# Patient Record
Sex: FEMALE | URBAN - METROPOLITAN AREA
[De-identification: names, ages, dates, MRNs, and addresses within clinical notes are randomized per-mention and may not be internally consistent; named-entity substitution may affect disease eponyms.]

---

## 2019-02-04 ENCOUNTER — APPOINTMENT (RX ONLY)
Dept: URBAN - METROPOLITAN AREA CLINIC 8 | Facility: CLINIC | Age: 54
Setting detail: DERMATOLOGY
End: 2019-02-04

## 2019-02-04 DIAGNOSIS — L81.1 CHLOASMA: ICD-10-CM

## 2019-02-04 DIAGNOSIS — L65.9 NONSCARRING HAIR LOSS, UNSPECIFIED: ICD-10-CM

## 2019-02-04 DIAGNOSIS — L20.89 OTHER ATOPIC DERMATITIS: ICD-10-CM

## 2019-02-04 PROBLEM — L20.84 INTRINSIC (ALLERGIC) ECZEMA: Status: ACTIVE | Noted: 2019-02-04

## 2019-02-04 PROCEDURE — 11104 PUNCH BX SKIN SINGLE LESION: CPT

## 2019-02-04 PROCEDURE — ? PRESCRIPTION

## 2019-02-04 PROCEDURE — ? COUNSELING

## 2019-02-04 PROCEDURE — 99203 OFFICE O/P NEW LOW 30 MIN: CPT | Mod: 25

## 2019-02-04 PROCEDURE — ? BIOPSY BY PUNCH METHOD

## 2019-02-04 PROCEDURE — ? ADDITIONAL NOTES

## 2019-02-04 RX ORDER — TRIAMCINOLONE ACETONIDE 1 MG/G
CREAM TOPICAL BID
Qty: 1 | Refills: 1 | Status: ERX | COMMUNITY
Start: 2019-02-04

## 2019-02-04 RX ORDER — PHARMACY COMPOUNDING ACCESSORY
1G EACH MISCELLANEOUS QHS
Qty: 20 | Refills: 1 | Status: ERX | COMMUNITY
Start: 2019-02-04

## 2019-02-04 RX ADMIN — Medication 1G: at 15:19

## 2019-02-04 RX ADMIN — TRIAMCINOLONE ACETONIDE: 1 CREAM TOPICAL at 14:44

## 2019-02-04 ASSESSMENT — LOCATION SIMPLE DESCRIPTION DERM
LOCATION SIMPLE: SCALP
LOCATION SIMPLE: RIGHT PRETIBIAL REGION
LOCATION SIMPLE: LEFT CHEEK

## 2019-02-04 ASSESSMENT — LOCATION ZONE DERM
LOCATION ZONE: SCALP
LOCATION ZONE: FACE
LOCATION ZONE: LEG

## 2019-02-04 ASSESSMENT — LOCATION DETAILED DESCRIPTION DERM
LOCATION DETAILED: RIGHT PROXIMAL PRETIBIAL REGION
LOCATION DETAILED: LEFT INFERIOR CENTRAL MALAR CHEEK
LOCATION DETAILED: LEFT CENTRAL PARIETAL SCALP

## 2019-02-04 NOTE — PROCEDURE: COUNSELING
Detail Level: Detailed
Patient Specific Counseling (Will Not Stick From Patient To Patient): ………\\nHair survey filled out and scanned\\n\\nHair shedding x 4 years\\nCurrently taking Nutrafol\\n\\nBeen on Losartan x 10 yrs\\nHx of low iron - colonoscopy 2 years ago without significant findings \\n\\nPull test (+)\\n\\nWill check labs - ordered today\\nscalp biopsy r/o TE vs Diffuse Alopecia Areata
Detail Level: Simple
Detail Level: Zone

## 2019-02-04 NOTE — PROCEDURE: BIOPSY BY PUNCH METHOD
Billing Type: Third-Party Bill
Additional Anesthesia Volume In Cc (Will Not Render If 0): 0
Anesthesia Type: 1% lidocaine with epinephrine
Detail Level: Simple
Punch Size In Mm: 4
Suture Removal: 14 days
Anesthesia Volume In Cc (Will Not Render If 0): 0.7
Wound Care: Petrolatum
Post-Care Instructions: I reviewed with the patient in detail post-care instructions. Patient is to keep the biopsy site dry overnight, and then apply bacitracin twice daily until healed. Patient may apply hydrogen peroxide soaks to remove any crusting.
Epidermal Sutures: 3-0 Prolene
Bill For Surgical Tray: no
Biopsy Type: H and E
Was A Bandage Applied: Yes
Home Suture Removal Text: Patient was provided a home suture removal kit and will remove their sutures at home.  If they have any questions or difficulties they will call the office.
Hemostasis: None
Consent: Written consent was obtained and risks were reviewed including but not limited to scarring, infection, bleeding, scabbing, incomplete removal, nerve damage and allergy to anesthesia.
Lab: -[id number]
Notification Instructions: Patient will be notified of biopsy results. However, patient instructed to call the office if not contacted within 2 weeks.
Dressing: bandage

## 2019-02-19 ENCOUNTER — APPOINTMENT (RX ONLY)
Dept: URBAN - METROPOLITAN AREA CLINIC 8 | Facility: CLINIC | Age: 54
Setting detail: DERMATOLOGY
End: 2019-02-19

## 2019-02-19 DIAGNOSIS — L81.1 CHLOASMA: ICD-10-CM | Status: UNCHANGED

## 2019-02-19 DIAGNOSIS — L64.8 OTHER ANDROGENIC ALOPECIA: ICD-10-CM

## 2019-02-19 DIAGNOSIS — L20.89 OTHER ATOPIC DERMATITIS: ICD-10-CM | Status: IMPROVED

## 2019-02-19 PROBLEM — L20.84 INTRINSIC (ALLERGIC) ECZEMA: Status: ACTIVE | Noted: 2019-02-19

## 2019-02-19 PROCEDURE — ? ADDITIONAL NOTES

## 2019-02-19 PROCEDURE — ? TREATMENT REGIMEN

## 2019-02-19 PROCEDURE — ? COUNSELING

## 2019-02-19 PROCEDURE — 99214 OFFICE O/P EST MOD 30 MIN: CPT

## 2019-02-19 ASSESSMENT — LOCATION DETAILED DESCRIPTION DERM
LOCATION DETAILED: RIGHT SUPERIOR PARIETAL SCALP
LOCATION DETAILED: RIGHT PROXIMAL PRETIBIAL REGION
LOCATION DETAILED: LEFT INFERIOR CENTRAL MALAR CHEEK

## 2019-02-19 ASSESSMENT — LOCATION ZONE DERM
LOCATION ZONE: FACE
LOCATION ZONE: LEG
LOCATION ZONE: SCALP

## 2019-02-19 ASSESSMENT — LOCATION SIMPLE DESCRIPTION DERM
LOCATION SIMPLE: RIGHT PRETIBIAL REGION
LOCATION SIMPLE: SCALP
LOCATION SIMPLE: LEFT CHEEK

## 2019-02-19 NOTE — PROCEDURE: ADDITIONAL NOTES
Detail Level: Simple
Additional Notes: 10% HQ and 10% KA in cmpd
Additional Notes: Pt never received compound will call [facility] today

## 2019-02-19 NOTE — PROCEDURE: COUNSELING
Patient Specific Counseling (Will Not Stick From Patient To Patient): ......\\nBx proven androgenic alopecia BW showed positive GORDON needs to f/u with PCP copy of Bx and BW given to pt today \\n\\nHair shedding x 4 years\\nCurrently taking Nutrafol\\n\\nBeen on Losartan x 10 yrs\\nHx of low iron - colonoscopy 2 years ago without significant findings \\n\\nPull test (+)
Detail Level: Detailed
Detail Level: Simple
Detail Level: Zone

## 2019-02-19 NOTE — PROCEDURE: MIPS QUALITY
Quality 131: Pain Assessment And Follow-Up: Pain assessment using a standardized tool is documented as negative, no follow-up plan required
Quality 110: Preventive Care And Screening: Influenza Immunization: Influenza Immunization not Administered for Documented Reasons.
Detail Level: Detailed

## 2019-02-19 NOTE — PROCEDURE: TREATMENT REGIMEN
Detail Level: Zone
Otc Regimen: Hair handout given today recommended Rogaine 5% foam QHS, olaplex hair tx when coloring hair. Discussed PRP r/b/s combined with Rogaine if pt wants to start
Continue Regimen: Ctn Nutrofol supplement start OTC iron tablet.
Continue Regimen: Ctn TAC BID x 2 weeks when flaring then as needed

## 2019-09-06 ENCOUNTER — RX ONLY (OUTPATIENT)
Age: 54
Setting detail: RX ONLY
End: 2019-09-06

## 2019-09-06 RX ORDER — PHARMACY COMPOUNDING ACCESSORY
1G EACH MISCELLANEOUS QHS
Qty: 20 | Refills: 1 | Status: ERX

## 2022-03-18 ENCOUNTER — PREPPED CHART (OUTPATIENT)
Dept: URBAN - METROPOLITAN AREA CLINIC 80 | Facility: CLINIC | Age: 57
End: 2022-03-18

## 2022-03-18 PROBLEM — H43.393 VITREOUS FLOATERS: Noted: 2022-03-18

## 2022-03-18 PROBLEM — H35.412 LATTICE DEGENERATION OF RETINA: Status: WELL-CONTROLLED | Noted: 2022-03-18

## 2022-03-18 PROBLEM — H43.393 VITREOUS FLOATERS: Status: STABILIZING | Noted: 2022-03-18

## 2022-03-18 PROBLEM — H35.712 CENTRAL SEROUS RETINOPATHY: Status: WELL-CONTROLLED | Noted: 2022-03-18

## 2022-03-18 PROBLEM — H35.712 CENTRAL SEROUS RETINOPATHY: Status: STABILIZING | Noted: 2022-03-18

## 2022-03-18 PROBLEM — H33.312 RETINAL TEAR: Status: WELL-CONTROLLED | Noted: 2022-03-18

## 2022-03-18 PROBLEM — H35.413 LATTICE DEGENERATION OF RETINA: Noted: 2022-03-18

## 2022-03-18 PROBLEM — H43.391 VITREOUS FLOATERS: Status: STABILIZING | Noted: 2022-03-18

## 2022-03-18 PROBLEM — H33.312 RETINAL TEAR: Noted: 2022-03-18

## 2022-03-18 PROBLEM — H43.393 VITREOUS SYNERESIS: Status: STABILIZING | Noted: 2022-03-18

## 2022-03-18 PROBLEM — H35.712 CENTRAL SEROUS RETINOPATHY: Noted: 2022-03-18

## 2022-03-25 ASSESSMENT — VISUAL ACUITY
OS_PH: 20/25
OD_PH: 20/20
OS_SC: 20/150 +1
OD_SC: 20/30 +2

## 2022-03-25 ASSESSMENT — TONOMETRY
OS_IOP_MMHG: 17
OD_IOP_MMHG: 15

## 2022-03-30 ENCOUNTER — FOLLOW UP (OUTPATIENT)
Dept: URBAN - METROPOLITAN AREA CLINIC 80 | Facility: CLINIC | Age: 57
End: 2022-03-30

## 2022-03-30 DIAGNOSIS — H43.393: ICD-10-CM

## 2022-03-30 DIAGNOSIS — H35.412: ICD-10-CM

## 2022-03-30 DIAGNOSIS — H33.312: ICD-10-CM

## 2022-03-30 DIAGNOSIS — H35.712: ICD-10-CM

## 2022-03-30 DIAGNOSIS — H46.8: ICD-10-CM

## 2022-03-30 PROCEDURE — 92134 CPTRZ OPH DX IMG PST SGM RTA: CPT

## 2022-03-30 PROCEDURE — 92235 FLUORESCEIN ANGRPH MLTIFRAME: CPT

## 2022-03-30 PROCEDURE — 92014 COMPRE OPH EXAM EST PT 1/>: CPT

## 2022-03-30 PROCEDURE — 92201 OPSCPY EXTND RTA DRAW UNI/BI: CPT

## 2022-03-30 ASSESSMENT — VISUAL ACUITY
OS_PH: 20/25
OS_SC: 20/150+2

## 2022-03-30 ASSESSMENT — TONOMETRY: OS_IOP_MMHG: 16

## 2022-04-14 ENCOUNTER — LASER ONLY (OUTPATIENT)
Dept: URBAN - METROPOLITAN AREA CLINIC 67 | Facility: CLINIC | Age: 57
End: 2022-04-14

## 2022-04-14 DIAGNOSIS — H33.312: ICD-10-CM

## 2022-04-14 PROCEDURE — 67145 PROPH RTA DTCHMNT PC: CPT

## 2022-04-14 ASSESSMENT — VISUAL ACUITY
OS_PH: 20/30-1
OS_SC: 20/150+3

## 2022-04-14 ASSESSMENT — TONOMETRY: OS_IOP_MMHG: 16

## 2022-05-18 ENCOUNTER — FOLLOW UP (OUTPATIENT)
Dept: URBAN - METROPOLITAN AREA CLINIC 80 | Facility: CLINIC | Age: 57
End: 2022-05-18

## 2022-05-18 DIAGNOSIS — H33.302: ICD-10-CM

## 2022-05-18 DIAGNOSIS — H33.312: ICD-10-CM

## 2022-05-18 DIAGNOSIS — H46.8: ICD-10-CM

## 2022-05-18 DIAGNOSIS — H35.712: ICD-10-CM

## 2022-05-18 DIAGNOSIS — H43.393: ICD-10-CM

## 2022-05-18 PROCEDURE — 92201 OPSCPY EXTND RTA DRAW UNI/BI: CPT

## 2022-05-18 PROCEDURE — 92014 COMPRE OPH EXAM EST PT 1/>: CPT

## 2022-05-18 ASSESSMENT — VISUAL ACUITY
OS_PH: 20/30-2
OD_SC: 20/25-1
OS_SC: 20/150-1
OD_PH: 20/20

## 2022-05-18 ASSESSMENT — TONOMETRY
OD_IOP_MMHG: 16
OS_IOP_MMHG: 17

## 2024-06-03 ENCOUNTER — PROBLEM (OUTPATIENT)
Dept: URBAN - METROPOLITAN AREA CLINIC 67 | Facility: CLINIC | Age: 59
End: 2024-06-03

## 2024-06-03 DIAGNOSIS — H33.302: ICD-10-CM

## 2024-06-03 DIAGNOSIS — H43.812: ICD-10-CM

## 2024-06-03 DIAGNOSIS — H35.411: ICD-10-CM

## 2024-06-03 DIAGNOSIS — H43.391: ICD-10-CM

## 2024-06-03 DIAGNOSIS — H35.712: ICD-10-CM

## 2024-06-03 DIAGNOSIS — H46.8: ICD-10-CM

## 2024-06-03 PROCEDURE — 92014 COMPRE OPH EXAM EST PT 1/>: CPT

## 2024-06-03 PROCEDURE — 92134 CPTRZ OPH DX IMG PST SGM RTA: CPT

## 2024-06-03 PROCEDURE — 92201 OPSCPY EXTND RTA DRAW UNI/BI: CPT

## 2024-06-03 ASSESSMENT — VISUAL ACUITY
OS_SC: 20/70-1
OS_PH: 20/20
OD_SC: 20/20-1

## 2024-06-03 ASSESSMENT — TONOMETRY
OS_IOP_MMHG: 14
OD_IOP_MMHG: 14

## 2024-06-19 ENCOUNTER — FOLLOW UP (OUTPATIENT)
Dept: URBAN - METROPOLITAN AREA CLINIC 80 | Facility: CLINIC | Age: 59
End: 2024-06-19

## 2024-06-19 DIAGNOSIS — H43.812: ICD-10-CM

## 2024-06-19 PROCEDURE — 92014 COMPRE OPH EXAM EST PT 1/>: CPT

## 2024-06-19 PROCEDURE — 92201 OPSCPY EXTND RTA DRAW UNI/BI: CPT

## 2024-06-19 ASSESSMENT — VISUAL ACUITY
OS_PH: 20/40-2
OS_SC: 20/80-2

## 2024-08-07 ENCOUNTER — FOLLOW UP (OUTPATIENT)
Dept: URBAN - METROPOLITAN AREA CLINIC 80 | Facility: CLINIC | Age: 59
End: 2024-08-07

## 2024-08-07 DIAGNOSIS — H43.812: ICD-10-CM

## 2024-08-07 DIAGNOSIS — H43.393: ICD-10-CM

## 2024-08-07 PROCEDURE — 92014 COMPRE OPH EXAM EST PT 1/>: CPT

## 2024-08-07 PROCEDURE — 92201 OPSCPY EXTND RTA DRAW UNI/BI: CPT

## 2024-08-07 ASSESSMENT — VISUAL ACUITY: OS_CC: 20/20

## 2024-08-07 ASSESSMENT — TONOMETRY: OS_IOP_MMHG: 12

## 2024-08-24 ENCOUNTER — PROBLEM (OUTPATIENT)
Dept: URBAN - METROPOLITAN AREA CLINIC 67 | Facility: CLINIC | Age: 59
End: 2024-08-24

## 2024-08-24 DIAGNOSIS — H46.8: ICD-10-CM

## 2024-08-24 DIAGNOSIS — H35.712: ICD-10-CM

## 2024-08-24 DIAGNOSIS — H43.812: ICD-10-CM

## 2024-08-24 PROCEDURE — 92014 COMPRE OPH EXAM EST PT 1/>: CPT

## 2024-08-24 PROCEDURE — 92201 OPSCPY EXTND RTA DRAW UNI/BI: CPT

## 2024-08-24 PROCEDURE — 92134 CPTRZ OPH DX IMG PST SGM RTA: CPT

## 2024-08-24 ASSESSMENT — VISUAL ACUITY
OD_SC: 20/25
OD_PH: 20/20-1
OS_PH: 20/40-1
OS_SC: 20/100+1

## 2024-08-24 ASSESSMENT — TONOMETRY
OD_IOP_MMHG: 14
OS_IOP_MMHG: 13